# Patient Record
Sex: MALE | Race: ASIAN | NOT HISPANIC OR LATINO | URBAN - METROPOLITAN AREA
[De-identification: names, ages, dates, MRNs, and addresses within clinical notes are randomized per-mention and may not be internally consistent; named-entity substitution may affect disease eponyms.]

---

## 2017-09-16 ENCOUNTER — EMERGENCY (EMERGENCY)
Facility: HOSPITAL | Age: 27
LOS: 1 days | Discharge: HOME | End: 2017-09-16
Admitting: EMERGENCY MEDICINE
Payer: SELF-PAY

## 2017-09-16 VITALS
WEIGHT: 160.06 LBS | SYSTOLIC BLOOD PRESSURE: 147 MMHG | HEIGHT: 65 IN | OXYGEN SATURATION: 98 % | HEART RATE: 98 BPM | TEMPERATURE: 99 F | RESPIRATION RATE: 16 BRPM | DIASTOLIC BLOOD PRESSURE: 100 MMHG

## 2017-09-16 DIAGNOSIS — M79.602 PAIN IN LEFT ARM: ICD-10-CM

## 2017-09-16 DIAGNOSIS — M75.22 BICIPITAL TENDINITIS, LEFT SHOULDER: ICD-10-CM

## 2017-09-16 PROCEDURE — 73080 X-RAY EXAM OF ELBOW: CPT | Mod: 26,LT

## 2017-09-16 PROCEDURE — 99283 EMERGENCY DEPT VISIT LOW MDM: CPT

## 2017-09-16 PROCEDURE — 99283 EMERGENCY DEPT VISIT LOW MDM: CPT | Mod: 25

## 2017-09-16 PROCEDURE — 73080 X-RAY EXAM OF ELBOW: CPT

## 2017-09-16 NOTE — ED PROVIDER NOTE - OBJECTIVE STATEMENT
pt to ed co arm pain after lifting left arm near biceps no deformity no fever no dizzy no headache no chills no NVD no chest pain no SOB no shakes no aches no other  injury no other complaints FROM to arm NVI

## 2017-09-16 NOTE — ED PROVIDER NOTE - MEDICAL DECISION MAKING DETAILS
pt to ed co pain to left elbow after lifting yesterday pain over biceps tendon possible rupture possible tendonitis will do NSAIDS sling and FU ortho

## 2017-12-11 ENCOUNTER — EMERGENCY (EMERGENCY)
Facility: HOSPITAL | Age: 27
LOS: 1 days | Discharge: HOME | End: 2017-12-11
Attending: EMERGENCY MEDICINE | Admitting: EMERGENCY MEDICINE
Payer: COMMERCIAL

## 2017-12-11 VITALS
RESPIRATION RATE: 18 BRPM | DIASTOLIC BLOOD PRESSURE: 99 MMHG | SYSTOLIC BLOOD PRESSURE: 170 MMHG | TEMPERATURE: 99 F | OXYGEN SATURATION: 98 % | HEART RATE: 101 BPM

## 2017-12-11 PROCEDURE — 87205 SMEAR GRAM STAIN: CPT

## 2017-12-11 PROCEDURE — 99284 EMERGENCY DEPT VISIT MOD MDM: CPT | Mod: 25

## 2017-12-11 PROCEDURE — 10061 I&D ABSCESS COMP/MULTIPLE: CPT

## 2017-12-11 PROCEDURE — 87075 CULTR BACTERIA EXCEPT BLOOD: CPT

## 2017-12-11 PROCEDURE — 87070 CULTURE OTHR SPECIMN AEROBIC: CPT

## 2017-12-11 PROCEDURE — 10061 I&D ABSCESS COMP/MULTIPLE: CPT | Mod: RT

## 2017-12-11 NOTE — ED PROVIDER NOTE - MUSCULOSKELETAL, MLM
Spine appears normal, range of motion is not limited, no muscle or joint tenderness. No midline vertebral tenderness.

## 2017-12-11 NOTE — ED PROVIDER NOTE - ATTENDING CONTRIBUTION TO CARE
28yo M with R buttock abscess with purulent drainage.  NAD, completely nontoxic, afebrile, NCAT, PERRL, CN grossly intact, S1S2, CTAB, abd soft nontender with no rebound/guarding/masses, no LE edema, R buttock abscess with foul smelling drainage noted.  I+D, cultures, d/c home with abx and close f/u.

## 2017-12-11 NOTE — ED PROCEDURE NOTE - ATTENDING CONTRIBUTION TO CARE
Attending MD Ley:  I personally have seen and examined this patient.  Resident note reviewed and agree on plan of care and except where noted.  See MDM for details.

## 2017-12-11 NOTE — ED PROVIDER NOTE - SKIN WOUND TYPE
Right lateral buttock abscess with scant amount of foul smelling dark brown drainage. 5 cm of induration around wound. No surrounding erythema. Tender to touch on area of induration but no surrounding tenderness./abscess(s) Right upper lateral buttock abscess with scant amount of foul smelling dark brown drainage. 5 cm of induration around wound. No surrounding erythema. Tender to touch on area of induration but no surrounding tenderness./abscess(s)

## 2017-12-11 NOTE — ED PROCEDURE NOTE - CPROC ED INCISION DRAIN DET1
Incision was left open, and packing placed./Iodoform packing placed with 3 cm tail left open to outside.

## 2017-12-11 NOTE — ED PROVIDER NOTE - CARE PLAN
Principal Discharge DX:	Abscess of buttock, right Principal Discharge DX:	Abscess of buttock, right  Instructions for follow-up, activity and diet:	1. Return to ED or your PMD in 2 days for wound recheck and packing change.    2. Keep covered and dry.  Take Clindamycin 300mg every 6 hours for 1 week.  Motrin or Tylenol as directed for pain/fever. Warm compresses for pain  3. Any increased pain, redness, fever, chills return to the ED immediately Principal Discharge DX:	Abscess of buttock, right  Instructions for follow-up, activity and diet:	1. Return to ED or your PMD in 2 days for wound recheck and packing change.    2. Keep covered and dry.  Take Bactrim 1 tab every 12 hours as prescribed. Take Motrin or Tylenol as directed for pain/fever. Warm compresses for pain  3. Any increased pain, redness, fever, chills return to the ED immediately

## 2017-12-11 NOTE — ED PROVIDER NOTE - PLAN OF CARE
1. Return to ED or your PMD in 2 days for wound recheck and packing change.    2. Keep covered and dry.  Take Clindamycin 300mg every 6 hours for 1 week.  Motrin or Tylenol as directed for pain/fever. Warm compresses for pain  3. Any increased pain, redness, fever, chills return to the ED immediately 1. Return to ED or your PMD in 2 days for wound recheck and packing change.    2. Keep covered and dry.  Take Bactrim 1 tab every 12 hours as prescribed. Take Motrin or Tylenol as directed for pain/fever. Warm compresses for pain  3. Any increased pain, redness, fever, chills return to the ED immediately

## 2017-12-11 NOTE — ED PROCEDURE NOTE - PROCEDURE ADDITIONAL DETAILS
round hypoechoic area superficial to skin with debris noted inside and posterior enhancement also noted, findings most likely c/w abscess, no e/o cobblestoning on us
Bleeding controlled at end of procedure. Gauze placed over wound and stabilized with a tegaderm bandage.

## 2017-12-11 NOTE — ED PROVIDER NOTE - OBJECTIVE STATEMENT
Patient is a 28 yo otherwise healthy male presenting to the ED complaining of a right buttock cyst. He states he first noticed a "tiny bump" there 3-4 years ago but it had never bothered him until last week when it began to get bigger and started to drain "dark pus". He states he's been squeezing the cyst every day for the last week and it's been continuously draining  a dark brown pus that had a foul odor to it. Today he woke up and the area felt "sore and hard", which was a change from how it was previously. It now hurts to touch and when he sits on the affected area. He went to [facility] yesterday where he was prescribed Keflex for the wound but hasn't taken yet. He soaked the area yesterday with warm water with mild relief. He denies any fever, chills, headache, chest pain, shortness of breath, bowel/bladder incontinence, or redness around the site. Patient is a 28 yo otherwise healthy male presenting to the ED complaining of a right buttock cyst. He states he first noticed a "tiny bump" there 3-4 years ago but it had never bothered him until last week when it began to get bigger and started to drain "dark pus". He states he's been squeezing the cyst every day for the last week and it's been continuously draining  a dark brown pus that had a foul odor to it. Today he woke up and the area felt "sore and hard", which was a change from how it was previously. It now hurts to touch and when he sits on the affected area. He went to [facility] yesterday where he was prescribed Keflex for the wound but hasn't taken yet. He soaked the area yesterday with warm water with mild relief. He denies any fever, chills, headache, nausea/vomiting, chest pain, shortness of breath, bowel/bladder incontinence, or redness around the site.

## 2017-12-15 LAB
CULTURE RESULTS: SIGNIFICANT CHANGE UP
CULTURE RESULTS: SIGNIFICANT CHANGE UP
SPECIMEN SOURCE: SIGNIFICANT CHANGE UP
SPECIMEN SOURCE: SIGNIFICANT CHANGE UP

## 2017-12-16 RX ORDER — METRONIDAZOLE 500 MG
1 TABLET ORAL
Qty: 21 | Refills: 0 | OUTPATIENT
Start: 2017-12-16 | End: 2017-12-22

## 2017-12-16 NOTE — ED POST DISCHARGE NOTE - RESULT SUMMARY
abscess Cx positive for mixed anaerobes, patient on Bactrim- poor coverage for anaerobes, Called and spoke w patient, wound is improving but still draining. I explained the culture results and recommended switching to Flagyl which I sent to his pharmacy. I explained that he should switch only if the wound continues to drain or is not healed within the next 2 days. -  Cachorro Salguero PA-C

## 2018-03-12 NOTE — ED PROVIDER NOTE - NEUROLOGICAL, MLM
Patient Education     Pharyngitis: Strep (Presumed)    You have pharyngitis (sore throat). The cause is thought to be the streptococcus, or strep, bacterium. Strep throat infection can cause throat pain that is worse when swallowing, aching all over, headache, and fever. The infection may be spread by coughing, kissing, or touching others after touching your mouth or nose. Antibiotic medications are given to treat the infection.  Home care  · Rest at home. Drink plenty of fluids to avoid dehydration.  · No work or school for the first 2 days of taking the antibiotics. After this time, you will not be contagious. You can then return to work or school if you are feeling better.   · The antibiotic medication must be taken for the full 10 days, even if you feel better. This is very important to ensure the infection is treated. It is also important to prevent drug-resistant organisms from developing. If you were given an antibiotic shot, no more antibiotics are needed.  · You may use acetaminophen or ibuprofen to control pain or fever, unless another medicine was prescribed for this. If you have chronic liver or kidney disease or ever had a stomach ulcer or GI bleeding, talk with your doctor before using these medicines.  · Throat lozenges or a throat-numbing sprays can help reduce throat pain. Gargling with warm salt water can also help. Dissolve 1/2 teaspoon of salt in 1 8 ounce glass of warm water.   · Avoid salty or spicy foods, which can irritate the throat.  Follow-up care  Follow up with your healthcare provider or our staff if you are not improving over the next week.  When to seek medical advice  Call your healthcare provider right away if any of these occur:  · Fever as directed by your doctor.   · New or worsening ear pain, sinus pain, or headache  · Painful lumps in the back of neck  · Stiff neck  · Lymph nodes are getting larger  · Inability to swallow liquids, excessive drooling, or inability to open mouth  wide due to throat pain  · Signs of dehydration (very dark urine or no urine, sunken eyes, dizziness)  · Trouble breathing or noisy breathing  · Muffled voice  · New rash  © 8118-9814 [employer]. [address] [place] [address], [place] [address]. All rights reserved. This information is not intended as a substitute for professional medical care. Always follow your healthcare professional's instructions.            Alert and oriented, no focal deficits, no motor or sensory deficits.

## 2018-04-02 NOTE — ED PROCEDURE NOTE - CPROC ED TIME OUT STATEMENT1
Health Maintenance Summary     Topic Due On Due Status Completed On    IMMUNIZATION - DTaP/Tdap/Td Mar 4, 2025 Not Due Mar 4, 2015    Immunization-Influenza Sep 1, 2018 Not Due     Depression Screening Apr 2, 2019 Not Due Apr 2, 2018          Patient is up to date, no discussion needed .              
“Patient's name, , procedure and correct site were confirmed during the [place] Timeout.”
“Patient's name, , procedure and correct site were confirmed during the [place] Timeout.”

## 2018-11-01 ENCOUNTER — TRANSCRIPTION ENCOUNTER (OUTPATIENT)
Age: 28
End: 2018-11-01

## 2020-04-26 ENCOUNTER — MESSAGE (OUTPATIENT)
Age: 30
End: 2020-04-26

## 2021-04-11 ENCOUNTER — APPOINTMENT (OUTPATIENT)
Dept: DISASTER EMERGENCY | Facility: CLINIC | Age: 31
End: 2021-04-11

## 2021-04-11 DIAGNOSIS — Z01.818 ENCOUNTER FOR OTHER PREPROCEDURAL EXAMINATION: ICD-10-CM

## 2021-04-11 PROBLEM — Z00.00 ENCOUNTER FOR PREVENTIVE HEALTH EXAMINATION: Status: ACTIVE | Noted: 2021-04-11

## 2021-04-17 LAB — SARS-COV-2 N GENE NPH QL NAA+PROBE: NOT DETECTED

## 2021-11-28 ENCOUNTER — TRANSCRIPTION ENCOUNTER (OUTPATIENT)
Age: 31
End: 2021-11-28

## 2021-11-28 ENCOUNTER — EMERGENCY (EMERGENCY)
Facility: HOSPITAL | Age: 31
LOS: 1 days | Discharge: HOME | End: 2021-11-28
Attending: EMERGENCY MEDICINE
Payer: COMMERCIAL

## 2021-11-28 VITALS — DIASTOLIC BLOOD PRESSURE: 78 MMHG | HEART RATE: 78 BPM | SYSTOLIC BLOOD PRESSURE: 154 MMHG | OXYGEN SATURATION: 99 %

## 2021-11-28 VITALS
HEIGHT: 65 IN | OXYGEN SATURATION: 98 % | DIASTOLIC BLOOD PRESSURE: 106 MMHG | WEIGHT: 171.96 LBS | HEART RATE: 86 BPM | RESPIRATION RATE: 18 BRPM | SYSTOLIC BLOOD PRESSURE: 152 MMHG | TEMPERATURE: 98 F

## 2021-11-28 LAB
ALBUMIN SERPL ELPH-MCNC: 4.8 G/DL — SIGNIFICANT CHANGE UP (ref 3.3–5)
ALP SERPL-CCNC: 161 U/L — HIGH (ref 40–120)
ALT FLD-CCNC: 49 U/L — HIGH (ref 10–45)
ANION GAP SERPL CALC-SCNC: 13 MMOL/L — SIGNIFICANT CHANGE UP (ref 5–17)
APPEARANCE UR: CLEAR — SIGNIFICANT CHANGE UP
APTT BLD: 30.6 SEC — SIGNIFICANT CHANGE UP (ref 27.5–35.5)
AST SERPL-CCNC: 29 U/L — SIGNIFICANT CHANGE UP (ref 10–40)
BASE EXCESS BLDV CALC-SCNC: 1.3 MMOL/L — SIGNIFICANT CHANGE UP (ref -2–2)
BASOPHILS # BLD AUTO: 0.08 K/UL — SIGNIFICANT CHANGE UP (ref 0–0.2)
BASOPHILS NFR BLD AUTO: 0.8 % — SIGNIFICANT CHANGE UP (ref 0–2)
BILIRUB SERPL-MCNC: 0.2 MG/DL — SIGNIFICANT CHANGE UP (ref 0.2–1.2)
BILIRUB UR-MCNC: NEGATIVE — SIGNIFICANT CHANGE UP
BUN SERPL-MCNC: 14 MG/DL — SIGNIFICANT CHANGE UP (ref 7–23)
CA-I SERPL-SCNC: 1.25 MMOL/L — SIGNIFICANT CHANGE UP (ref 1.15–1.33)
CALCIUM SERPL-MCNC: 9.6 MG/DL — SIGNIFICANT CHANGE UP (ref 8.4–10.5)
CHLORIDE BLDV-SCNC: 103 MMOL/L — SIGNIFICANT CHANGE UP (ref 96–108)
CHLORIDE SERPL-SCNC: 104 MMOL/L — SIGNIFICANT CHANGE UP (ref 96–108)
CO2 BLDV-SCNC: 29 MMOL/L — HIGH (ref 22–26)
CO2 SERPL-SCNC: 24 MMOL/L — SIGNIFICANT CHANGE UP (ref 22–31)
COLOR SPEC: SIGNIFICANT CHANGE UP
CREAT SERPL-MCNC: 1.26 MG/DL — SIGNIFICANT CHANGE UP (ref 0.5–1.3)
DIFF PNL FLD: NEGATIVE — SIGNIFICANT CHANGE UP
EOSINOPHIL # BLD AUTO: 0.49 K/UL — SIGNIFICANT CHANGE UP (ref 0–0.5)
EOSINOPHIL NFR BLD AUTO: 4.8 % — SIGNIFICANT CHANGE UP (ref 0–6)
GAS PNL BLDV: 136 MMOL/L — SIGNIFICANT CHANGE UP (ref 136–145)
GAS PNL BLDV: SIGNIFICANT CHANGE UP
GAS PNL BLDV: SIGNIFICANT CHANGE UP
GLUCOSE BLDV-MCNC: 94 MG/DL — SIGNIFICANT CHANGE UP (ref 70–99)
GLUCOSE SERPL-MCNC: 92 MG/DL — SIGNIFICANT CHANGE UP (ref 70–99)
GLUCOSE UR QL: NEGATIVE — SIGNIFICANT CHANGE UP
HCO3 BLDV-SCNC: 28 MMOL/L — SIGNIFICANT CHANGE UP (ref 22–29)
HCT VFR BLD CALC: 46.4 % — SIGNIFICANT CHANGE UP (ref 39–50)
HCT VFR BLDA CALC: 46 % — SIGNIFICANT CHANGE UP (ref 39–51)
HGB BLD CALC-MCNC: 15.4 G/DL — SIGNIFICANT CHANGE UP (ref 12.6–17.4)
HGB BLD-MCNC: 15.1 G/DL — SIGNIFICANT CHANGE UP (ref 13–17)
IMM GRANULOCYTES NFR BLD AUTO: 0.2 % — SIGNIFICANT CHANGE UP (ref 0–1.5)
INR BLD: 0.95 RATIO — SIGNIFICANT CHANGE UP (ref 0.88–1.16)
KETONES UR-MCNC: NEGATIVE — SIGNIFICANT CHANGE UP
LACTATE BLDV-MCNC: 1.4 MMOL/L — SIGNIFICANT CHANGE UP (ref 0.7–2)
LEUKOCYTE ESTERASE UR-ACNC: NEGATIVE — SIGNIFICANT CHANGE UP
LYMPHOCYTES # BLD AUTO: 3.43 K/UL — HIGH (ref 1–3.3)
LYMPHOCYTES # BLD AUTO: 33.8 % — SIGNIFICANT CHANGE UP (ref 13–44)
MCHC RBC-ENTMCNC: 27 PG — SIGNIFICANT CHANGE UP (ref 27–34)
MCHC RBC-ENTMCNC: 32.5 GM/DL — SIGNIFICANT CHANGE UP (ref 32–36)
MCV RBC AUTO: 82.9 FL — SIGNIFICANT CHANGE UP (ref 80–100)
MONOCYTES # BLD AUTO: 0.83 K/UL — SIGNIFICANT CHANGE UP (ref 0–0.9)
MONOCYTES NFR BLD AUTO: 8.2 % — SIGNIFICANT CHANGE UP (ref 2–14)
NEUTROPHILS # BLD AUTO: 5.31 K/UL — SIGNIFICANT CHANGE UP (ref 1.8–7.4)
NEUTROPHILS NFR BLD AUTO: 52.2 % — SIGNIFICANT CHANGE UP (ref 43–77)
NITRITE UR-MCNC: NEGATIVE — SIGNIFICANT CHANGE UP
NRBC # BLD: 0 /100 WBCS — SIGNIFICANT CHANGE UP (ref 0–0)
PCO2 BLDV: 49 MMHG — SIGNIFICANT CHANGE UP (ref 42–55)
PH BLDV: 7.36 — SIGNIFICANT CHANGE UP (ref 7.32–7.43)
PH UR: 6.5 — SIGNIFICANT CHANGE UP (ref 5–8)
PLATELET # BLD AUTO: 264 K/UL — SIGNIFICANT CHANGE UP (ref 150–400)
PO2 BLDV: 36 MMHG — SIGNIFICANT CHANGE UP (ref 25–45)
POTASSIUM BLDV-SCNC: 4 MMOL/L — SIGNIFICANT CHANGE UP (ref 3.5–5.1)
POTASSIUM SERPL-MCNC: 3.9 MMOL/L — SIGNIFICANT CHANGE UP (ref 3.5–5.3)
POTASSIUM SERPL-SCNC: 3.9 MMOL/L — SIGNIFICANT CHANGE UP (ref 3.5–5.3)
PROT SERPL-MCNC: 7.5 G/DL — SIGNIFICANT CHANGE UP (ref 6–8.3)
PROT UR-MCNC: NEGATIVE — SIGNIFICANT CHANGE UP
PROTHROM AB SERPL-ACNC: 11.4 SEC — SIGNIFICANT CHANGE UP (ref 10.6–13.6)
RBC # BLD: 5.6 M/UL — SIGNIFICANT CHANGE UP (ref 4.2–5.8)
RBC # FLD: 12.8 % — SIGNIFICANT CHANGE UP (ref 10.3–14.5)
SAO2 % BLDV: 59.7 % — LOW (ref 67–88)
SODIUM SERPL-SCNC: 141 MMOL/L — SIGNIFICANT CHANGE UP (ref 135–145)
SP GR SPEC: 1.03 — HIGH (ref 1.01–1.02)
UROBILINOGEN FLD QL: NEGATIVE — SIGNIFICANT CHANGE UP
WBC # BLD: 10.16 K/UL — SIGNIFICANT CHANGE UP (ref 3.8–10.5)
WBC # FLD AUTO: 10.16 K/UL — SIGNIFICANT CHANGE UP (ref 3.8–10.5)

## 2021-11-28 PROCEDURE — 83605 ASSAY OF LACTIC ACID: CPT

## 2021-11-28 PROCEDURE — 84295 ASSAY OF SERUM SODIUM: CPT

## 2021-11-28 PROCEDURE — 82947 ASSAY GLUCOSE BLOOD QUANT: CPT

## 2021-11-28 PROCEDURE — 85610 PROTHROMBIN TIME: CPT

## 2021-11-28 PROCEDURE — 85014 HEMATOCRIT: CPT

## 2021-11-28 PROCEDURE — 99285 EMERGENCY DEPT VISIT HI MDM: CPT

## 2021-11-28 PROCEDURE — 81003 URINALYSIS AUTO W/O SCOPE: CPT

## 2021-11-28 PROCEDURE — 85730 THROMBOPLASTIN TIME PARTIAL: CPT

## 2021-11-28 PROCEDURE — 74177 CT ABD & PELVIS W/CONTRAST: CPT | Mod: 26,MA

## 2021-11-28 PROCEDURE — 82803 BLOOD GASES ANY COMBINATION: CPT

## 2021-11-28 PROCEDURE — 85025 COMPLETE CBC W/AUTO DIFF WBC: CPT

## 2021-11-28 PROCEDURE — 84132 ASSAY OF SERUM POTASSIUM: CPT

## 2021-11-28 PROCEDURE — 74177 CT ABD & PELVIS W/CONTRAST: CPT | Mod: MA

## 2021-11-28 PROCEDURE — 82330 ASSAY OF CALCIUM: CPT

## 2021-11-28 PROCEDURE — 80053 COMPREHEN METABOLIC PANEL: CPT

## 2021-11-28 PROCEDURE — 99284 EMERGENCY DEPT VISIT MOD MDM: CPT | Mod: 25

## 2021-11-28 PROCEDURE — 85018 HEMOGLOBIN: CPT

## 2021-11-28 PROCEDURE — 82435 ASSAY OF BLOOD CHLORIDE: CPT

## 2021-11-28 RX ORDER — SODIUM CHLORIDE 9 MG/ML
1000 INJECTION, SOLUTION INTRAVENOUS ONCE
Refills: 0 | Status: COMPLETED | OUTPATIENT
Start: 2021-11-28 | End: 2021-11-28

## 2021-11-28 RX ADMIN — SODIUM CHLORIDE 1000 MILLILITER(S): 9 INJECTION, SOLUTION INTRAVENOUS at 15:33

## 2021-11-28 NOTE — ED ADULT NURSE NOTE - OBJECTIVE STATEMENT
31 yr old male was working last night when he stretched to reach something and felt a sharp pain in lower right side of abd. didn't think much of it but during the night with movement and walking it reproduces the pain. has no medical history or meds. on assessment a and o x 3 lungs clear bad soft slightly tender when press the lower right side. no swelling in extremities no n/v/d no fevers no other complaints.

## 2021-11-28 NOTE — ED PROVIDER NOTE - CLINICAL SUMMARY MEDICAL DECISION MAKING FREE TEXT BOX
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 patient reports that the pain was sudden onset last night associated with nausea. no fever/chills/vomiting or diarrhea. pain sometimes made worse by movement. no prior abd surgeries. no urinary symptoms/back pains or testicular pain.  VS wnl, well appearing, in NAD. Moist mucosae, pink conjunctivae. Neck supple, neuro grossly intact. Lungs clear, cardiac wnl, no JVD. Abdomen soft/NT, 'unable to reproduce pain by superficial or deep palpation but + obturator test, no CVAT. No pedal edema, no calf TTP. Poss psoas muscle strain, low suspicion for appy. Discussed w pt at length diff dg and options for w/u, shared decision making, opted for CT r/o appy.  If w/u neg DC w PCP f/u and good return instructions discussed w pt at bedside

## 2021-11-28 NOTE — ED PROVIDER NOTE - OBJECTIVE STATEMENT
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 patient reports that the pain was sudden onset last night associated with nausea. no fever/chills/vomiting or diarrhea. pain sometimes made worse by movement. no prior abd surgeries. no urinary symptoms or testicular pain.

## 2021-11-28 NOTE — ED PROVIDER NOTE - PATIENT PORTAL LINK FT
You can access the FollowMyHealth Patient Portal offered by [facility] by registering at the following website: [email or web address]. By joining [facility]’s FollowMyHealth portal, you will also be able to view your health information using other applications (apps) compatible with our system.

## 2021-11-28 NOTE — ED PROVIDER NOTE - NSFOLLOWUPINSTRUCTIONS_ED_ALL_ED_FT
Follow up with your Primary Care Physician within the next 2-3 days  Bring a copy of your test results with you to your appointment  Continue your current medication regimen  Return to the Emergency Room if you experience new or worsening symptoms  Take Motrin 400-600mg every 6 hrs as needed for pain. Take with food   Take Tylenol 650mg every 6 hrs as needed for pain.     Strain    A strain is a stretch or tear in one of the muscles in your body. This is caused by an injury to the area such as a twisting mechanism. Symptoms include pain, swelling, or bruising. Rest that area over the next several days and slowly resume activity when tolerated. Ice can help with swelling and pain.     SEEK IMMEDIATE MEDICAL CARE IF YOU HAVE ANY OF THE FOLLOWING SYMPTOMS: worsening pain, inability to move that body part, numbness or tingling.     Abdominal Pain    Many things can cause abdominal pain. Many times, abdominal pain is not caused by a disease and will improve without treatment. Your health care provider will do a physical exam to determine if there is a dangerous cause of your pain; blood tests and imaging may help determine the cause of your pain. However, in many cases, no cause may be found and you may need further testing as an outpatient. Monitor your abdominal pain for any changes.     SEEK IMMEDIATE MEDICAL CARE IF YOU HAVE ANY OF THE FOLLOWING SYMPTOMS: worsening abdominal pain, uncontrollable vomiting, profuse diarrhea, inability to have bowel movements or pass gas, black or bloody stools, fever accompanying chest pain or back pain, or fainting. These symptoms may represent a serious problem that is an emergency. Do not wait to see if the symptoms will go away. Get medical help right away. Call 911 and do not drive yourself to the hospital.

## 2024-08-11 ENCOUNTER — NON-APPOINTMENT (OUTPATIENT)
Age: 34
End: 2024-08-11

## 2024-08-12 ENCOUNTER — APPOINTMENT (OUTPATIENT)
Dept: ORTHOPEDIC SURGERY | Facility: CLINIC | Age: 34
End: 2024-08-12
Payer: OTHER MISCELLANEOUS

## 2024-08-12 VITALS — WEIGHT: 165 LBS | HEIGHT: 65 IN | BODY MASS INDEX: 27.49 KG/M2

## 2024-08-12 DIAGNOSIS — M25.521 PAIN IN RIGHT ELBOW: ICD-10-CM

## 2024-08-12 PROCEDURE — 99203 OFFICE O/P NEW LOW 30 MIN: CPT

## 2024-08-12 RX ORDER — NAPROXEN 500 MG/1
500 TABLET ORAL
Qty: 60 | Refills: 0 | Status: ACTIVE | COMMUNITY
Start: 2024-08-12 | End: 1900-01-01

## 2024-08-12 NOTE — RETURN TO WORK/SCHOOL
[FreeTextEntry1] : Dav was seen today for evaluation management of acute orthopedic right elbow injury.  He is cleared for full work duty without restrictions as of 8/13/2024. Should you have any questions please call the office at [phone] Thank you for your understanding.     Fuentes Francis DO, ATC Primary Care Sports Medicine [facility]

## 2024-08-12 NOTE — PHYSICAL EXAM
[de-identified] : Constitutional: Well-nourished, well-developed, No acute distress Respiratory:  Good respiratory effort, no SOB Lymphatic: No regional lymphadenopathy, no lymphedema Psychiatric: Pleasant and normal affect, alert and oriented x3 Musculoskeletal: normal except where as noted in regional exam  Right elbow: APPEARANCE: + Mild swelling and resolving ecchymosis overlying the distal anterior upper arm, antecubital fossa, and medial forearm.  There is questionable deformity of the distal biceps tendon and subsequent myotendinous junction  POSITIVE TENDERNESS: + Tenderness in the antecubital fossa at the area of the biceps tendon and myotendinous junction NONTENDER: lateral and medial epicondyles, posterior capsule, triceps tendon ROM: full & painless flexion/extension, pronation/supination.  RESISTIVE TESTING: + Mildly painful 4/5 resisted elbow flexion and supination, otherwise 5/5 painless resisted elbow extension, wrist/long finger extension. painless resisted wrist/long finger flexion. painless resisted pronation.  SPECIAL TESTS: stable v/v stress. neg tinel's cubital tunnel Vasc: 2+ radial pulse Neuro: AIN, PIN, Ulnar nerve intact to motor Sensation: Intact to light touch throughout

## 2024-08-12 NOTE — DISCUSSION/SUMMARY
[de-identified] : Patient was seen today for evaluation management of acute right anterior elbow pain due to possible biceps strain versus tear.  Patient has questionable exam findings for possible biceps tendon rupture, he has very good strength and range of motion, but does have bruising and ecchymosis concerning for possible partial tear.  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  However, there are exam findings concerning for possible biceps tendon injury, I would recommend we proceed with MRI of the right elbow to determine the extent of soft tissue injury.  Patient started on a course of oral NSAIDs, prescription given for Naprosyn (We discussed all possible side effects of this medication).  Patient will follow-up once MRI results are available.  Patient appreciates and agrees with current plan.  This note was generated using dragon medical dictation software.  A reasonable effort has been made for proofreading its contents, but typos may still remain.  If there are any questions or points of clarification needed please notify my office.

## 2024-08-12 NOTE — HISTORY OF PRESENT ILLNESS
[de-identified] : 34 NEHEMIAH NOLASCO presents with right arm pain. He was lifting a 50 pound bag when he felt a tearing sensation in his anterior elbow. 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 he went to ED at [facility]. He went back to work 8/5 and has only missed one day of work because of previously scheduled vacation. He then developed swelling and ecchymosis about his anterior right forearm.  He states that [facility] 8[facility]24 [facility] and was given Toradol 15mg, xrays were taken of right humerus and forearm, read was no acute osseous abnormality.

## 2024-08-12 NOTE — RETURN TO WORK/SCHOOL
[FreeTextEntry1] : Dav was seen today for evaluation management of acute orthopedic right elbow injury.  He is cleared for full work duty without restrictions as of 8/13/2024. Should you have any questions please call the office at [phone] Thank you for your understanding.     Fuentes Francis DO, ATC Primary Care Sports Medicine [facility]

## 2024-08-12 NOTE — DISCUSSION/SUMMARY
[de-identified] : Patient was seen today for evaluation management of acute right anterior elbow pain due to possible biceps strain versus tear.  Patient has questionable exam findings for possible biceps tendon rupture, he has very good strength and range of motion, but does have bruising and ecchymosis concerning for possible partial tear.  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  However, there are exam findings concerning for possible biceps tendon injury, I would recommend we proceed with MRI of the right elbow to determine the extent of soft tissue injury.  Patient started on a course of oral NSAIDs, prescription given for Naprosyn (We discussed all possible side effects of this medication).  Patient will follow-up once MRI results are available.  Patient appreciates and agrees with current plan.  This note was generated using dragon medical dictation software.  A reasonable effort has been made for proofreading its contents, but typos may still remain.  If there are any questions or points of clarification needed please notify my office.

## 2024-08-12 NOTE — PHYSICAL EXAM
[de-identified] : Constitutional: Well-nourished, well-developed, No acute distress Respiratory:  Good respiratory effort, no SOB Lymphatic: No regional lymphadenopathy, no lymphedema Psychiatric: Pleasant and normal affect, alert and oriented x3 Musculoskeletal: normal except where as noted in regional exam  Right elbow: APPEARANCE: + Mild swelling and resolving ecchymosis overlying the distal anterior upper arm, antecubital fossa, and medial forearm.  There is questionable deformity of the distal biceps tendon and subsequent myotendinous junction  POSITIVE TENDERNESS: + Tenderness in the antecubital fossa at the area of the biceps tendon and myotendinous junction NONTENDER: lateral and medial epicondyles, posterior capsule, triceps tendon ROM: full & painless flexion/extension, pronation/supination.  RESISTIVE TESTING: + Mildly painful 4/5 resisted elbow flexion and supination, otherwise 5/5 painless resisted elbow extension, wrist/long finger extension. painless resisted wrist/long finger flexion. painless resisted pronation.  SPECIAL TESTS: stable v/v stress. neg tinel's cubital tunnel Vasc: 2+ radial pulse Neuro: AIN, PIN, Ulnar nerve intact to motor Sensation: Intact to light touch throughout

## 2024-08-12 NOTE — HISTORY OF PRESENT ILLNESS
[de-identified] : 34 NEHEMIAH NOLASCO presents with right arm pain. He was lifting a 50 pound bag when he felt a tearing sensation in his anterior elbow. 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 he went to ED at [facility]. He went back to work 8/5 and has only missed one day of work because of previously scheduled vacation. He then developed swelling and ecchymosis about his anterior right forearm.  He states that on 8/5/24 [facility], and was given Toradol 15mg, xrays were taken of right humerus and forearm, read was no acute osseous abnormality.

## 2024-09-09 ENCOUNTER — RX RENEWAL (OUTPATIENT)
Age: 34
End: 2024-09-09